# Patient Record
Sex: MALE | Race: WHITE | Employment: FULL TIME | URBAN - NONMETROPOLITAN AREA
[De-identification: names, ages, dates, MRNs, and addresses within clinical notes are randomized per-mention and may not be internally consistent; named-entity substitution may affect disease eponyms.]

---

## 2020-04-17 ENCOUNTER — NURSE ONLY (OUTPATIENT)
Dept: LAB | Age: 60
End: 2020-04-17

## 2020-04-17 LAB
ALBUMIN SERPL-MCNC: 4.4 G/DL (ref 3.5–5.1)
ALP BLD-CCNC: 69 U/L (ref 38–126)
ALT SERPL-CCNC: 23 U/L (ref 11–66)
ANION GAP SERPL CALCULATED.3IONS-SCNC: 16 MEQ/L (ref 8–16)
AST SERPL-CCNC: 27 U/L (ref 5–40)
AVERAGE GLUCOSE: 168 MG/DL (ref 70–126)
BILIRUB SERPL-MCNC: 1.3 MG/DL (ref 0.3–1.2)
BUN BLDV-MCNC: 23 MG/DL (ref 7–22)
CALCIUM SERPL-MCNC: 9.6 MG/DL (ref 8.5–10.5)
CHLORIDE BLD-SCNC: 101 MEQ/L (ref 98–111)
CHOLESTEROL, TOTAL: 146 MG/DL (ref 100–199)
CO2: 23 MEQ/L (ref 23–33)
CREAT SERPL-MCNC: 1 MG/DL (ref 0.4–1.2)
CREATININE, URINE: 115.8 MG/DL
GFR SERPL CREATININE-BSD FRML MDRD: 77 ML/MIN/1.73M2
GLUCOSE BLD-MCNC: 156 MG/DL (ref 70–108)
HBA1C MFR BLD: 7.6 % (ref 4.4–6.4)
HDLC SERPL-MCNC: 40 MG/DL
LDL CHOLESTEROL CALCULATED: 83 MG/DL
MICROALBUMIN UR-MCNC: < 1.2 MG/DL
MICROALBUMIN/CREAT UR-RTO: 10 MG/G (ref 0–30)
POTASSIUM SERPL-SCNC: 4.8 MEQ/L (ref 3.5–5.2)
PROSTATE SPECIFIC ANTIGEN: 0.74 NG/ML (ref 0–1)
SODIUM BLD-SCNC: 140 MEQ/L (ref 135–145)
TOTAL PROTEIN: 7.5 G/DL (ref 6.1–8)
TRIGL SERPL-MCNC: 117 MG/DL (ref 0–199)

## 2022-08-29 ENCOUNTER — OFFICE VISIT (OUTPATIENT)
Dept: CARDIOLOGY CLINIC | Age: 62
End: 2022-08-29
Payer: COMMERCIAL

## 2022-08-29 VITALS
HEIGHT: 73 IN | SYSTOLIC BLOOD PRESSURE: 128 MMHG | DIASTOLIC BLOOD PRESSURE: 75 MMHG | HEART RATE: 61 BPM | WEIGHT: 241.4 LBS | BODY MASS INDEX: 31.99 KG/M2

## 2022-08-29 DIAGNOSIS — Z01.810 PREOP CARDIOVASCULAR EXAM: Primary | ICD-10-CM

## 2022-08-29 PROCEDURE — 99204 OFFICE O/P NEW MOD 45 MIN: CPT | Performed by: INTERNAL MEDICINE

## 2022-08-29 RX ORDER — ATORVASTATIN CALCIUM 40 MG/1
40 TABLET, FILM COATED ORAL DAILY
COMMUNITY
Start: 2022-08-25

## 2022-08-29 RX ORDER — GLIMEPIRIDE 2 MG/1
2 TABLET ORAL
COMMUNITY
Start: 2022-08-25

## 2022-08-29 RX ORDER — SITAGLIPTIN 100 MG/1
100 TABLET, FILM COATED ORAL DAILY
COMMUNITY
Start: 2022-08-26

## 2022-08-29 RX ORDER — EMPAGLIFLOZIN 25 MG/1
25 TABLET, FILM COATED ORAL DAILY
COMMUNITY
Start: 2022-08-25

## 2022-08-29 RX ORDER — LISINOPRIL 20 MG/1
20 TABLET ORAL DAILY
COMMUNITY
Start: 2022-08-25

## 2022-08-29 RX ORDER — M-VIT,TX,IRON,MINS/CALC/FOLIC 27MG-0.4MG
1 TABLET ORAL DAILY
COMMUNITY

## 2022-08-29 NOTE — PROGRESS NOTES
Needs clearance for Surgery on big toe- left foot 9-6-22  [facility] to be faxing over the EKG

## 2022-08-29 NOTE — PROGRESS NOTES
[id number] [facility] [address] [facility] [address]  Dept: [phone]  Dept Fax: [phone]  Loc: [phone]    Visit Date: 8/29/2022    Mr. Justus Fairbanks is a 65 y.o. male  who presented for:  Chief Complaint   Patient presents with    New Patient     Abnormal EKG       HPI:   64 yo M with hx of DM, HTN, HLD is referred for abnormal EKG and preop risk stratification for foot surgery. Patient is scheduled for surgery on 9/6/22. Patients EKG shows Sinus bradycardia at 51 bpm, inferior, anteriolateral infarct. Patient states he has no symptoms, reports excellent exercise tolerance. Current Outpatient Medications:     glimepiride (AMARYL) 2 MG tablet, Take 2 mg by mouth every morning (before breakfast), Disp: , Rfl:     lisinopril (PRINIVIL;ZESTRIL) 20 MG tablet, Take 20 mg by mouth daily, Disp: , Rfl:     JANUVIA 100 MG tablet, Take 100 mg by mouth daily, Disp: , Rfl:     JARDIANCE 25 MG tablet, 25 mg daily, Disp: , Rfl:     atorvastatin (LIPITOR) 40 MG tablet, Take 40 mg by mouth daily, Disp: , Rfl:     Multiple Vitamins-Minerals (THERAPEUTIC MULTIVITAMIN-MINERALS) tablet, Take 1 tablet by mouth daily, Disp: , Rfl:     Magnesium 400 MG CAPS, Take by mouth, Disp: , Rfl:     metformin (GLUCOPHAGE) 500 MG tablet, Take 1,000 mg by mouth 2 times daily (with meals), Disp: , Rfl:     Past Medical History  Jovani Walker  has a past medical history of Hyperlipidemia, Hypertension, and Type II or unspecified type diabetes mellitus without mention of complication, not stated as uncontrolled. Social History  Jovani Walker  reports that he quit smoking about 30 years ago. His smoking use included cigarettes. He smoked an average of .5 packs per day. He uses smokeless tobacco. He reports that he does not drink alcohol. Family History  Jovani Walker family history includes Heart Failure in his mother.     Past Surgical History   Past Surgical History: Procedure Laterality Date    FINGER SURGERY Left 03/14/2013    Long Finger Revision Amputation    KIDNEY STONE SURGERY         Subjective:     REVIEW OF SYSTEMS  Constitutional: denies sweats, chills and fever  HENT: denies  congestion, sinus pressure, sneezing and sore throat. Eyes: denies  pain, discharge, redness and itching. Respiratory: denies apnea, cough  Gastrointestinal: denies blood in stool, constipation, diarrhea   Endocrine: denies cold intolerance, heat intolerance, polydipsia. Genitourinary: denies dysuria, enuresis, flank pain and hematuria. Musculoskeletal: denies arthralgias, joint swelling and neck pain. Neurological: denies numbness and headaches. Psychiatric/Behavioral: denies agitation, confusion, decreased concentration and dysphoric mood    All others reviewed and are negative. Objective:     /75   Pulse 61   Ht 6' 1\" (1.854 m)   Wt 241 lb 6.4 oz (109.5 kg)   BMI 31.85 kg/m²     Wt Readings from Last 3 Encounters:   08/29/22 241 lb 6.4 oz (109.5 kg)   03/14/13 228 lb 13.4 oz (103.8 kg)   08/01/12 238 lb (108 kg)     BP Readings from Last 3 Encounters:   08/29/22 128/75   03/14/13 151/90   03/14/13 128/84       PHYSICAL EXAM  Constitutional: Oriented to person, place, and time. Appears well-developed and well-nourished. HENT:   Head: Normocephalic and atraumatic. Eyes: EOM are normal. Pupils are equal, round, and reactive to light. Neck: Normal range of motion. Neck supple. No JVD present. Cardiovascular: Normal rate , normal heart sounds and intact distal pulses. Pulmonary/Chest: Effort normal and breath sounds normal. No respiratory distress. No wheezes. No rales. Abdominal: Soft. Bowel sounds are normal. No distension. There is no tenderness. Musculoskeletal: Normal range of motion. No edema. Neurological: Alert and oriented to person, place, and time. No cranial nerve deficit. Coordination normal.   Skin: Skin is warm and dry.    Psychiatric: Normal mood and affect. No results found for: CKTOTAL, CKMB, CKMBINDEX    No results found for: WBC, RBC, HGB, HCT, MCV, MCH, MCHC, RDW, PLT, MPV    Lab Results   Component Value Date/Time     04/17/2020 08:00 AM    K 4.8 04/17/2020 08:00 AM     04/17/2020 08:00 AM    CO2 23 04/17/2020 08:00 AM    BUN 23 04/17/2020 08:00 AM    LABALBU 4.4 04/17/2020 08:00 AM    CREATININE 1.0 04/17/2020 08:00 AM    CALCIUM 9.6 04/17/2020 08:00 AM    LABGLOM 77 04/17/2020 07:55 AM    GLUCOSE 156 04/17/2020 08:00 AM       Lab Results   Component Value Date/Time    ALKPHOS 69 04/17/2020 08:00 AM    ALT 23 04/17/2020 08:00 AM    AST 27 04/17/2020 08:00 AM    PROT 7.5 04/17/2020 08:00 AM    BILITOT 1.3 04/17/2020 08:00 AM    LABALBU 4.4 04/17/2020 08:00 AM       No results found for: MG    No results found for: INR, PROTIME      Lab Results   Component Value Date/Time    LABA1C 7.6 04/17/2020 08:00 AM       Lab Results   Component Value Date/Time    TRIG 117 04/17/2020 08:00 AM    HDL 40 04/17/2020 08:00 AM    LDLCALC 83 04/17/2020 08:00 AM       No results found for: TSH      Testing Reviewed:      I haveindividually reviewed the below cardiac tests    EKG:    ECHO: No results found for this or any previous visit. STRESS:    CATH:    Assessment/Plan       Diagnosis Orders   1. Preop cardiovascular exam            Preop risk stratification for foot surgery  DM  HTN  HLD    Reviewed EKG  Shows SB, inferior and anteriolateral infarct  Wife and patient states the findings were there in the past  Underwent Echo and stress test in the past  He denies any cardiac symptoms  Reports being able to walk 1 mile easily  May proceed with scheduled surgery at low to moderate risk  The patient is asked to make an attempt to improve diet and exercise patterns to aid in medical management of this problem.   Advised more plant based nutrition/meditarrean diet   Advised patient to call office or seek immediate medical attention if there is any new onset of  any chest pain, sob, palpitations, lightheadedness, dizziness, orthopnea, PND or pedal edema. All medication side effects were discussed in details. Thank youfor allowing me to participate in the care of this patient. Please do not hesitate to contact me for any further questions. Return if symptoms worsen or fail to improve, for Review testing, Regular follow up.        Electronically signed by Marla Jamison MD [facility]  8/29/2022 at 12:38 PM EDT

## 2023-09-15 ENCOUNTER — OFFICE VISIT (OUTPATIENT)
Dept: CARDIOLOGY CLINIC | Age: 63
End: 2023-09-15
Payer: COMMERCIAL

## 2023-09-15 VITALS
WEIGHT: 232.2 LBS | HEART RATE: 56 BPM | SYSTOLIC BLOOD PRESSURE: 136 MMHG | BODY MASS INDEX: 30.77 KG/M2 | DIASTOLIC BLOOD PRESSURE: 86 MMHG | HEIGHT: 73 IN

## 2023-09-15 DIAGNOSIS — R06.02 SOB (SHORTNESS OF BREATH): Primary | ICD-10-CM

## 2023-09-15 PROCEDURE — 93000 ELECTROCARDIOGRAM COMPLETE: CPT | Performed by: INTERNAL MEDICINE

## 2023-09-15 PROCEDURE — 99214 OFFICE O/P EST MOD 30 MIN: CPT | Performed by: INTERNAL MEDICINE

## 2023-09-15 RX ORDER — SILDENAFIL 100 MG/1
100 TABLET, FILM COATED ORAL PRN
COMMUNITY

## 2023-09-15 NOTE — PROGRESS NOTES
[address] [address]  Dept: [phone]  Dept Fax: [phone]  Loc: [phone]    Visit Date: 9/15/2023    Mr. Estrada Branch is a 59 y.o. male  who presented for:  Follow up   HPI:   58 yo M with hx of DM, HTN, HLD is here for a follow up was seen last year for preop for foot surgery. Patient is scheduled for surgery on 9/6/22. Patients EKG shows Sinus bradycardia at 51 bpm, inferior, anteriolateral infarct. Patient states he has no symptoms, reports excellent exercise tolerance. Current Outpatient Medications:     sildenafil (VIAGRA) 100 MG tablet, Take 1 tablet by mouth as needed for Erectile Dysfunction, Disp: , Rfl:     glimepiride (AMARYL) 2 MG tablet, Take 1 tablet by mouth every morning (before breakfast), Disp: , Rfl:     lisinopril (PRINIVIL;ZESTRIL) 20 MG tablet, Take 1 tablet by mouth daily, Disp: , Rfl:     JANUVIA 100 MG tablet, Take 0.5 tablets by mouth daily, Disp: , Rfl:     atorvastatin (LIPITOR) 40 MG tablet, Take 1 tablet by mouth daily, Disp: , Rfl:     Multiple Vitamins-Minerals (THERAPEUTIC MULTIVITAMIN-MINERALS) tablet, Take 1 tablet by mouth daily, Disp: , Rfl:     Magnesium 400 MG CAPS, Take by mouth, Disp: , Rfl:     metformin (GLUCOPHAGE) 500 MG tablet, Take 2 tablets by mouth 2 times daily (with meals), Disp: , Rfl:     Past Medical History  Suzi Mckee  has a past medical history of Hyperlipidemia, Hypertension, and Type II or unspecified type diabetes mellitus without mention of complication, not stated as uncontrolled. Social History  Suzi Mckee  reports that he quit smoking about 31 years ago. His smoking use included cigarettes. He smoked an average of .5 packs per day. He uses smokeless tobacco. He reports that he does not drink alcohol. Family History  Suzi Mckee family history includes Heart Failure in his mother.     Past Surgical History   Past Surgical History:

## 2024-07-18 ENCOUNTER — HOSPITAL ENCOUNTER (EMERGENCY)
Age: 64
Discharge: HOME | End: 2024-07-18
Payer: COMMERCIAL

## 2024-07-18 VITALS
BODY MASS INDEX: 30.48 KG/M2 | RESPIRATION RATE: 16 BRPM | WEIGHT: 231 LBS | SYSTOLIC BLOOD PRESSURE: 137 MMHG | OXYGEN SATURATION: 96 % | DIASTOLIC BLOOD PRESSURE: 80 MMHG | TEMPERATURE: 97.8 F | HEART RATE: 65 BPM

## 2024-07-18 DIAGNOSIS — L03.114 CELLULITIS OF LEFT UPPER EXTREMITY: ICD-10-CM

## 2024-07-18 DIAGNOSIS — L23.7 POISON IVY DERMATITIS: Primary | ICD-10-CM

## 2024-07-18 PROCEDURE — G0463 HOSPITAL OUTPT CLINIC VISIT: HCPCS

## 2024-07-18 PROCEDURE — 99213 OFFICE O/P EST LOW 20 MIN: CPT

## 2024-07-18 PROCEDURE — 99203 OFFICE O/P NEW LOW 30 MIN: CPT

## 2024-07-18 PROCEDURE — 96372 THER/PROPH/DIAG INJ SC/IM: CPT

## 2024-07-18 PROCEDURE — 6360000002 HC RX W HCPCS

## 2024-07-18 RX ORDER — METHYLPREDNISOLONE ACETATE 80 MG/ML
80 INJECTION, SUSPENSION INTRA-ARTICULAR; INTRALESIONAL; INTRAMUSCULAR; SOFT TISSUE ONCE
Status: COMPLETED | OUTPATIENT
Start: 2024-07-18 | End: 2024-07-18

## 2024-07-18 RX ORDER — SEMAGLUTIDE 1.34 MG/ML
INJECTION, SOLUTION SUBCUTANEOUS
COMMUNITY
Start: 2024-06-14

## 2024-07-18 RX ORDER — SULFAMETHOXAZOLE AND TRIMETHOPRIM 800; 160 MG/1; MG/1
1 TABLET ORAL 2 TIMES DAILY
Qty: 14 TABLET | Refills: 0 | Status: SHIPPED | OUTPATIENT
Start: 2024-07-18 | End: 2024-07-25

## 2024-07-18 RX ADMIN — METHYLPREDNISOLONE ACETATE 80 MG: 80 INJECTION, SUSPENSION INTRA-ARTICULAR; INTRALESIONAL; INTRAMUSCULAR; SOFT TISSUE at 18:15

## 2024-07-18 ASSESSMENT — ENCOUNTER SYMPTOMS
GASTROINTESTINAL NEGATIVE: 1
ALLERGIC/IMMUNOLOGIC NEGATIVE: 1
EYES NEGATIVE: 1
RESPIRATORY NEGATIVE: 1

## 2024-07-18 ASSESSMENT — PAIN - FUNCTIONAL ASSESSMENT
PAIN_FUNCTIONAL_ASSESSMENT: ACTIVITIES ARE NOT PREVENTED
PAIN_FUNCTIONAL_ASSESSMENT: NONE - DENIES PAIN

## 2024-07-18 NOTE — ED TRIAGE NOTES
Joo arrives to room with complaint of  itchy red rash at the crease of left elbow started this morning.  Also has a red bug bite at forearm which is itchy that started over the weekend.       Pt has used Laquer thinner on the bug bite which helped for awhile.

## 2024-07-18 NOTE — DISCHARGE INSTRUCTIONS
Medications as prescribed.  Increase fluid intake.  Wash area with mild soap and water.  Follow-up with family doctor on 7/22/2024.  Go to emergency room for any increased swelling, redness, drainage or any more streaking red marks.

## 2024-07-18 NOTE — ED PROVIDER NOTES
[facility] URGENT CARE  Urgent Care Encounter       CHIEF COMPLAINT       Chief Complaint   Patient presents with    Rash       Nurses Notes reviewed and I agree except as noted in the HPI.  HISTORY OF PRESENT ILLNESS   Joo Macias is a 64 y.o. male who presents to urgent care for rash.  Symptoms started 1 week ago.  Patient states got a bug bite a week ago and now red and swollen.  Then was doing some work outside and got into possible poison ivy.  Has rash on left upper and lower arm.  Denies fever, chills or bodyaches.    The history is provided by the patient. No  was used.       REVIEW OF SYSTEMS     Review of Systems   Constitutional: Negative.    HENT: Negative.     Eyes: Negative.    Respiratory: Negative.     Cardiovascular: Negative.    Gastrointestinal: Negative.    Endocrine: Negative.    Genitourinary: Negative.    Musculoskeletal: Negative.    Skin:  Rash: left upper and lower arm.   Allergic/Immunologic: Negative.    Neurological: Negative.    Hematological: Negative.    Psychiatric/Behavioral: Negative.         PAST MEDICAL HISTORY         Diagnosis Date    Hyperlipidemia     Hypertension     Type II or unspecified type diabetes mellitus without mention of complication, not stated as uncontrolled        SURGICALHISTORY     Patient  has a past surgical history that includes Kidney stone surgery and Finger surgery (Left, 03/14/2013).    CURRENT MEDICATIONS       Previous Medications    ATORVASTATIN (LIPITOR) 40 MG TABLET    Take 1 tablet by mouth daily    GLIMEPIRIDE (AMARYL) 2 MG TABLET    Take 1 tablet by mouth every morning (before breakfast)    JANUVIA 100 MG TABLET    Take 0.5 tablets by mouth daily    LISINOPRIL (PRINIVIL;ZESTRIL) 20 MG TABLET    Take 1 tablet by mouth daily    MAGNESIUM 400 MG CAPS    Take by mouth    METFORMIN (GLUCOPHAGE) 500 MG TABLET    Take 2 tablets by mouth 2 times daily (with meals)    MULTIPLE VITAMINS-MINERALS (THERAPEUTIC  MULTIVITAMIN-MINERALS) TABLET    Take 1 tablet by mouth daily    OZEMPIC, 1 MG/DOSE, 4 MG/3ML SOPN SC INJECTION    inject 1 mg subcutaneously once weekly    SILDENAFIL (VIAGRA) 100 MG TABLET    Take 1 tablet by mouth as needed for Erectile Dysfunction       ALLERGIES     Patient is is allergic to pcn [penicillins].    Patients   Immunization History   Administered Date(s) Administered    COVID-19, PFIZER PURPLE top, DILUTE for use, (age 12 y+), 30mcg/0.3mL 03/11/2021, 04/01/2021, 12/30/2021    TDaP, ADACEL (age 10y-64y), BOOSTRIX (age 10y+), IM, 0.5mL 03/13/2013       FAMILY HISTORY     Patient's family history includes Heart Failure in his mother.    SOCIAL HISTORY     Patient  reports that he quit smoking about 31 years ago. His smoking use included cigarettes. He uses smokeless tobacco. He reports that he does not drink alcohol and does not use drugs.    PHYSICAL EXAM     ED TRIAGE VITALS  BP: 137/80, Temp: 97.8 °F (36.6 °C), Pulse: 65, Respirations: 16, SpO2: 96 %,Estimated body mass index is 30.48 kg/m² as calculated from the following:    Height as of 9/15/23: 1.854 m (6' 1\").    Weight as of this encounter: 104.8 kg (231 lb).,No LMP for male patient.    Physical Exam  Vitals and nursing note reviewed.   Constitutional:       General: He is not in acute distress.     Appearance: Normal appearance. He is obese.   HENT:      Head: Normocephalic and atraumatic.      Nose: Nose normal.      Mouth/Throat:      Mouth: Mucous membranes are moist.   Eyes:      Conjunctiva/sclera: Conjunctivae normal.   Cardiovascular:      Rate and Rhythm: Normal rate and regular rhythm.      Heart sounds: Normal heart sounds.   Pulmonary:      Effort: Pulmonary effort is normal.      Breath sounds: Normal breath sounds.   Musculoskeletal:         General: Normal range of motion.   Skin:     General: Skin is warm and dry.      Capillary Refill: Capillary refill takes less than 2 seconds.      Findings: Rash and wound (left lower